# Patient Record
Sex: FEMALE | URBAN - METROPOLITAN AREA
[De-identification: names, ages, dates, MRNs, and addresses within clinical notes are randomized per-mention and may not be internally consistent; named-entity substitution may affect disease eponyms.]

---

## 2017-08-03 ENCOUNTER — OFFICE VISIT - HEALTHEAST (OUTPATIENT)
Dept: OBGYN | Facility: CLINIC | Age: 37
End: 2017-08-03

## 2017-08-03 DIAGNOSIS — R87.618 OTHER ABNORMAL CYTOLOGICAL FINDING OF SPECIMEN FROM CERVIX: ICD-10-CM

## 2017-08-03 ASSESSMENT — MIFFLIN-ST. JEOR: SCORE: 1264.32

## 2017-08-08 LAB
HPV INTERPRETATION - HISTORICAL: ABNORMAL
HPV INTERPRETER - HISTORICAL: ABNORMAL

## 2017-08-09 LAB
BKR LAB AP ABNORMAL BLEEDING: NO
BKR LAB AP BIRTH CONTROL/HORMONES: ABNORMAL
BKR LAB AP CERVICAL APPEARANCE: NORMAL
BKR LAB AP GYN ADEQUACY: ABNORMAL
BKR LAB AP GYN INTERPRETATION: ABNORMAL
BKR LAB AP HPV REFLEX: ABNORMAL
BKR LAB AP LMP: ABNORMAL
BKR LAB AP PATIENT STATUS: ABNORMAL
BKR LAB AP PREVIOUS ABNORMAL: ABNORMAL
BKR LAB AP PREVIOUS NORMAL: ABNORMAL
HIGH RISK?: NO
PATH REPORT.COMMENTS IMP SPEC: ABNORMAL
RESULT FLAG (HE HISTORICAL CONVERSION): ABNORMAL

## 2017-09-07 ENCOUNTER — AMBULATORY - HEALTHEAST (OUTPATIENT)
Dept: OBGYN | Facility: CLINIC | Age: 37
End: 2017-09-07

## 2017-09-07 DIAGNOSIS — R87.610 ASCUS WITH POSITIVE HIGH RISK HPV CERVICAL: ICD-10-CM

## 2017-09-07 DIAGNOSIS — R87.810 ASCUS WITH POSITIVE HIGH RISK HPV CERVICAL: ICD-10-CM

## 2017-09-11 LAB
LAB AP CHARGES (HE HISTORICAL CONVERSION): NORMAL
PATH REPORT.COMMENTS IMP SPEC: NORMAL
PATH REPORT.COMMENTS IMP SPEC: NORMAL
PATH REPORT.FINAL DX SPEC: NORMAL
PATH REPORT.GROSS SPEC: NORMAL
PATH REPORT.MICROSCOPIC SPEC OTHER STN: NORMAL
PATH REPORT.RELEVANT HX SPEC: NORMAL
RESULT FLAG (HE HISTORICAL CONVERSION): NORMAL

## 2018-08-20 ENCOUNTER — COMMUNICATION - HEALTHEAST (OUTPATIENT)
Dept: ADMINISTRATIVE | Facility: CLINIC | Age: 38
End: 2018-08-20

## 2021-05-31 VITALS — WEIGHT: 140 LBS | HEIGHT: 62 IN | BODY MASS INDEX: 25.76 KG/M2

## 2021-05-31 VITALS — WEIGHT: 138 LBS | BODY MASS INDEX: 25.45 KG/M2

## 2021-06-12 NOTE — PROGRESS NOTES
"Assessment / Impression     1. Other abnormal cytological finding of specimen from cervix         Plan:     1. Repeat pap smear and HPV testing today. Will notify patient of results when available and treatment plan.     Addendum- pap smear shows ascus HR HPV+, pt notified and colposcopy scheduled per [facility] guidelines.     Subjective:      HPI: Martha Gregg is a 36 y.o. female with abnormal pap smear history is here today to repeat her pap smear per [facility]P guidelines. She reports no change to her gyn or medical history. Same sexual partner. Non smoker. No hormonal birth control. Partner has vasectomy.   Pap smear history is:  6/16: Normal pap smear, but HR HPV+  2/15 ASCUS, HR HPV+  9/15, [facility] colp ISABELLA I    Review of Systems  Pertinent items are noted in HPI.       Objective:     Physical Examination: General appearance - alert, well appearing, and in no distress  BP 97/49 (Patient Site: Left Arm, Patient Position: Sitting, Cuff Size: Adult Regular)  Pulse (!) 50  Ht 5' 1.75\" (1.568 m)  Wt 140 lb (63.5 kg)  LMP 07/23/2017  BMI 25.81 kg/m2  : Normal external genitalia.  Speculum exam reveals a normal cervix with no abnormal discharge seen from cervical os.  Normal appearing vaginal mucosa.  Bimanual exam reveals a freely mobile uterus in the midline without any adnexal masses or tenderness. Pap smear obtained today.   "

## 2021-06-12 NOTE — PROGRESS NOTES
Colposcopy Procedure Note    Martha Gregg is a 36 y.o. female is here today for a Colposcopy.    Indications: Recent 8/17 ASCUS HR HPV+ pap smear    Pap Smear/[place] History:    6/16: Normal pap smear, but HR HPV+  2/15 ASCUS, HR HPV+  9/15, [facility],  colp ISABELLA I    Otherwise healthy without chronic medical conditions. Non smoker. One sexual partner-vasectomy for BC.     Procedure Details   The reason for procedure is explained and informed consent obtained.    Speculum placed in vagina and visualization of cervix achieved, cervix swabbed with 3% acetic acid solution. Procedure details: Endocervical curettage performed    Findings:  Cervix: SCJ visualized 360 degrees without lesions;       Specimens: ECC    Complications: none.    Plan:  Specimen labelled and sent to Pathology. Role of HPV in causing cervical pap smear abnormalities, dysplasia, and cancer is reviewed with the patient. She will be contacted in a few days with biopsy results and recommendations.    Joy Huggins     Addendum: [place]: no dysplasia seen. Pt notified. Repeat pap smear and HPV testing in one year per [facility]P guidelines. Reminder set in [facility] to call patient in one year.

## 2021-07-03 NOTE — ADDENDUM NOTE
Addendum Note by Joy Huggins MD at 9/7/2017  9:43 PM     Author: Joy Huggins MD Service: -- Author Type: Physician    Filed: 9/7/2017  9:43 PM Encounter Date: 9/7/2017 Status: Signed    : Joy Huggins MD (Physician)    Addended by: JOY HUGGINS on: 9/7/2017 09:43 PM        Modules accepted: Orders

## 2021-08-22 ENCOUNTER — HEALTH MAINTENANCE LETTER (OUTPATIENT)
Age: 41
End: 2021-08-22

## 2021-10-16 ENCOUNTER — HEALTH MAINTENANCE LETTER (OUTPATIENT)
Age: 41
End: 2021-10-16

## 2022-10-01 ENCOUNTER — HEALTH MAINTENANCE LETTER (OUTPATIENT)
Age: 42
End: 2022-10-01

## 2023-10-15 ENCOUNTER — HEALTH MAINTENANCE LETTER (OUTPATIENT)
Age: 43
End: 2023-10-15

## 2024-03-03 ENCOUNTER — HEALTH MAINTENANCE LETTER (OUTPATIENT)
Age: 44
End: 2024-03-03